# Patient Record
Sex: MALE | Race: BLACK OR AFRICAN AMERICAN | NOT HISPANIC OR LATINO | Employment: UNEMPLOYED | URBAN - METROPOLITAN AREA
[De-identification: names, ages, dates, MRNs, and addresses within clinical notes are randomized per-mention and may not be internally consistent; named-entity substitution may affect disease eponyms.]

---

## 2024-07-23 ENCOUNTER — HOSPITAL ENCOUNTER (EMERGENCY)
Facility: HOSPITAL | Age: 39
Discharge: PSYCHIATRIC OR SUBSTANCE USE FACILITY | End: 2024-07-24
Attending: EMERGENCY MEDICINE

## 2024-07-23 DIAGNOSIS — R45.851 SUICIDAL IDEATION: Primary | ICD-10-CM

## 2024-07-23 DIAGNOSIS — F10.10 ALCOHOL ABUSE: ICD-10-CM

## 2024-07-23 LAB
ALBUMIN SERPL BCP-MCNC: 3.6 G/DL (ref 3.5–5.2)
ALP SERPL-CCNC: 66 U/L (ref 55–135)
ALT SERPL W/O P-5'-P-CCNC: 28 U/L (ref 10–44)
AMPHET+METHAMPHET UR QL: ABNORMAL
ANION GAP SERPL CALC-SCNC: 9 MMOL/L (ref 8–16)
APAP SERPL-MCNC: <3 UG/ML (ref 10–20)
AST SERPL-CCNC: 42 U/L (ref 10–40)
BARBITURATES UR QL SCN>200 NG/ML: NEGATIVE
BASOPHILS # BLD AUTO: 0.05 K/UL (ref 0–0.2)
BASOPHILS NFR BLD: 0.7 % (ref 0–1.9)
BENZODIAZ UR QL SCN>200 NG/ML: NEGATIVE
BILIRUB SERPL-MCNC: 0.3 MG/DL (ref 0.1–1)
BILIRUB UR QL STRIP: NEGATIVE
BUN SERPL-MCNC: 7 MG/DL (ref 6–20)
BZE UR QL SCN: NEGATIVE
CALCIUM SERPL-MCNC: 9.7 MG/DL (ref 8.7–10.5)
CANNABINOIDS UR QL SCN: NEGATIVE
CHLORIDE SERPL-SCNC: 103 MMOL/L (ref 95–110)
CLARITY UR REFRACT.AUTO: CLEAR
CO2 SERPL-SCNC: 25 MMOL/L (ref 23–29)
COLOR UR AUTO: YELLOW
CREAT SERPL-MCNC: 1 MG/DL (ref 0.5–1.4)
CREAT UR-MCNC: 88 MG/DL (ref 23–375)
DIFFERENTIAL METHOD BLD: ABNORMAL
EOSINOPHIL # BLD AUTO: 0 K/UL (ref 0–0.5)
EOSINOPHIL NFR BLD: 0.4 % (ref 0–8)
ERYTHROCYTE [DISTWIDTH] IN BLOOD BY AUTOMATED COUNT: 13.4 % (ref 11.5–14.5)
EST. GFR  (NO RACE VARIABLE): >60 ML/MIN/1.73 M^2
ETHANOL SERPL-MCNC: 78 MG/DL
GLUCOSE SERPL-MCNC: 82 MG/DL (ref 70–110)
GLUCOSE UR QL STRIP: NEGATIVE
HCT VFR BLD AUTO: 40 % (ref 40–54)
HCV AB SERPL QL IA: NORMAL
HGB BLD-MCNC: 13.2 G/DL (ref 14–18)
HGB UR QL STRIP: NEGATIVE
HIV 1+2 AB+HIV1 P24 AG SERPL QL IA: NORMAL
IMM GRANULOCYTES # BLD AUTO: 0.01 K/UL (ref 0–0.04)
IMM GRANULOCYTES NFR BLD AUTO: 0.1 % (ref 0–0.5)
KETONES UR QL STRIP: NEGATIVE
LEUKOCYTE ESTERASE UR QL STRIP: NEGATIVE
LYMPHOCYTES # BLD AUTO: 2.9 K/UL (ref 1–4.8)
LYMPHOCYTES NFR BLD: 42.7 % (ref 18–48)
MCH RBC QN AUTO: 28.9 PG (ref 27–31)
MCHC RBC AUTO-ENTMCNC: 33 G/DL (ref 32–36)
MCV RBC AUTO: 88 FL (ref 82–98)
METHADONE UR QL SCN>300 NG/ML: NEGATIVE
MONOCYTES # BLD AUTO: 0.5 K/UL (ref 0.3–1)
MONOCYTES NFR BLD: 7.5 % (ref 4–15)
NEUTROPHILS # BLD AUTO: 3.3 K/UL (ref 1.8–7.7)
NEUTROPHILS NFR BLD: 48.6 % (ref 38–73)
NITRITE UR QL STRIP: NEGATIVE
NRBC BLD-RTO: 0 /100 WBC
OPIATES UR QL SCN: NEGATIVE
PCP UR QL SCN>25 NG/ML: NEGATIVE
PH UR STRIP: 8 [PH] (ref 5–8)
PLATELET # BLD AUTO: 250 K/UL (ref 150–450)
PMV BLD AUTO: 9.5 FL (ref 9.2–12.9)
POTASSIUM SERPL-SCNC: 3.7 MMOL/L (ref 3.5–5.1)
PROT SERPL-MCNC: 6.9 G/DL (ref 6–8.4)
PROT UR QL STRIP: NEGATIVE
RBC # BLD AUTO: 4.56 M/UL (ref 4.6–6.2)
SODIUM SERPL-SCNC: 137 MMOL/L (ref 136–145)
SP GR UR STRIP: 1.01 (ref 1–1.03)
TOXICOLOGY INFORMATION: ABNORMAL
TSH SERPL DL<=0.005 MIU/L-ACNC: 3.81 UIU/ML (ref 0.4–4)
URN SPEC COLLECT METH UR: NORMAL
WBC # BLD AUTO: 6.81 K/UL (ref 3.9–12.7)

## 2024-07-23 PROCEDURE — 81003 URINALYSIS AUTO W/O SCOPE: CPT | Mod: 59 | Performed by: PHYSICIAN ASSISTANT

## 2024-07-23 PROCEDURE — 84443 ASSAY THYROID STIM HORMONE: CPT | Performed by: PHYSICIAN ASSISTANT

## 2024-07-23 PROCEDURE — 86803 HEPATITIS C AB TEST: CPT | Performed by: PHYSICIAN ASSISTANT

## 2024-07-23 PROCEDURE — 80053 COMPREHEN METABOLIC PANEL: CPT | Performed by: PHYSICIAN ASSISTANT

## 2024-07-23 PROCEDURE — 25000003 PHARM REV CODE 250: Performed by: EMERGENCY MEDICINE

## 2024-07-23 PROCEDURE — 85025 COMPLETE CBC W/AUTO DIFF WBC: CPT | Performed by: PHYSICIAN ASSISTANT

## 2024-07-23 PROCEDURE — 87389 HIV-1 AG W/HIV-1&-2 AB AG IA: CPT | Performed by: PHYSICIAN ASSISTANT

## 2024-07-23 PROCEDURE — 82077 ASSAY SPEC XCP UR&BREATH IA: CPT | Performed by: PHYSICIAN ASSISTANT

## 2024-07-23 PROCEDURE — 80143 DRUG ASSAY ACETAMINOPHEN: CPT | Performed by: PHYSICIAN ASSISTANT

## 2024-07-23 PROCEDURE — 99285 EMERGENCY DEPT VISIT HI MDM: CPT

## 2024-07-23 PROCEDURE — 80307 DRUG TEST PRSMV CHEM ANLYZR: CPT | Performed by: PHYSICIAN ASSISTANT

## 2024-07-23 RX ORDER — AMLODIPINE BESYLATE 5 MG/1
5 TABLET ORAL
Status: COMPLETED | OUTPATIENT
Start: 2024-07-23 | End: 2024-07-23

## 2024-07-23 RX ADMIN — AMLODIPINE BESYLATE 5 MG: 5 TABLET ORAL at 08:07

## 2024-07-23 RX ADMIN — AMLODIPINE BESYLATE 5 MG: 5 TABLET ORAL at 11:07

## 2024-07-23 NOTE — ED PROVIDER NOTES
"Encounter Date: 7/23/2024       History     Chief Complaint   Patient presents with    Suicidal     38-year-old male with reported history of schizophrenia presents to the emergency department with suicidal ideation.  He states that he has been feeling suicidal for a while now.  He states that he has episodes in which he "plays chicken with cars. "He feels as though his suicidal thoughts are escalating and he feels as though this is the "calm before the storm. "He admits to attempts in the past.  He endorses alcohol and drug use.  States that he drinks about a 5th of liquor per day.  He drank about a pt before coming to the hospital today.  He also endorses drug use.  He states that he will take anything he can to make himself feel different.  He has been prescribed psychiatric medication in the past, but is noncompliant.  He can not recall the name of the medication.  He denies somatic complaints.  He denies other worsening or alleviating factors.      Review of patient's allergies indicates:   Allergen Reactions    Penicillins Anaphylaxis     No past medical history on file.  No past surgical history on file.  No family history on file.     Review of Systems   Psychiatric/Behavioral:  Positive for suicidal ideas.        Physical Exam     Initial Vitals [07/23/24 1517]   BP Pulse Resp Temp SpO2   131/85 100 20 98.4 °F (36.9 °C) 99 %      MAP       --         Physical Exam    Vitals reviewed.  Constitutional: He is not diaphoretic. No distress.   Disheveled    HENT:   Head: Normocephalic and atraumatic.   Mouth/Throat: Oropharynx is clear and moist.   Eyes: EOM are normal. Pupils are equal, round, and reactive to light.   Neck: Neck supple.   Normal range of motion.  Cardiovascular:  Normal rate.           Pulmonary/Chest: No respiratory distress.   Abdominal: He exhibits no distension.   Musculoskeletal:         General: Normal range of motion.      Cervical back: Normal range of motion and neck supple. "     Neurological: He is alert and oriented to person, place, and time. He has normal strength. GCS score is 15. GCS eye subscore is 4. GCS verbal subscore is 5. GCS motor subscore is 6.   Skin: Skin is warm and dry. Capillary refill takes less than 2 seconds.   Psychiatric: Judgment normal. His affect is blunt. His speech is delayed. He is withdrawn. He exhibits a depressed mood. He expresses suicidal ideation.         ED Course   Procedures  Labs Reviewed   CBC W/ AUTO DIFFERENTIAL - Abnormal       Result Value    WBC 6.81      RBC 4.56 (*)     Hemoglobin 13.2 (*)     Hematocrit 40.0      MCV 88      MCH 28.9      MCHC 33.0      RDW 13.4      Platelets 250      MPV 9.5      Immature Granulocytes 0.1      Gran # (ANC) 3.3      Immature Grans (Abs) 0.01      Lymph # 2.9      Mono # 0.5      Eos # 0.0      Baso # 0.05      nRBC 0      Gran % 48.6      Lymph % 42.7      Mono % 7.5      Eosinophil % 0.4      Basophil % 0.7      Differential Method Automated     COMPREHENSIVE METABOLIC PANEL - Abnormal    Sodium 137      Potassium 3.7      Chloride 103      CO2 25      Glucose 82      BUN 7      Creatinine 1.0      Calcium 9.7      Total Protein 6.9      Albumin 3.6      Total Bilirubin 0.3      Alkaline Phosphatase 66      AST 42 (*)     ALT 28      eGFR >60.0      Anion Gap 9     DRUG SCREEN PANEL, URINE EMERGENCY - Abnormal    Benzodiazepines Negative      Methadone metabolites Negative      Cocaine (Metab.) Negative      Opiate Scrn, Ur Negative      Barbiturate Screen, Ur Negative      Amphetamine Screen, Ur Presumptive Positive (*)     THC Negative      Phencyclidine Negative      Creatinine, Urine 88.0      Toxicology Information SEE COMMENT      Narrative:     Specimen Source->Urine   ALCOHOL,MEDICAL (ETHANOL) - Abnormal    Alcohol, Serum 78 (*)    ACETAMINOPHEN LEVEL - Abnormal    Acetaminophen (Tylenol), Serum <3.0 (*)    URINALYSIS, REFLEX TO URINE CULTURE    Specimen UA Urine, Clean Catch      Color, UA  Yellow      Appearance, UA Clear      pH, UA 8.0      Specific Gravity, UA 1.015      Protein, UA Negative      Glucose, UA Negative      Ketones, UA Negative      Bilirubin (UA) Negative      Occult Blood UA Negative      Nitrite, UA Negative      Leukocytes, UA Negative      Narrative:     Specimen Source->Urine   HIV 1 / 2 ANTIBODY   HEPATITIS C ANTIBODY   TSH          Imaging Results    None          Medications - No data to display  Medical Decision Making  Emergent evaluation of a 38 y.o. male presenting to the emergency department complaining of suicidal ideation. No active plan but is concerned for his safety and feels like he needs help. Patient is afebrile, hemodynamically stable, and non toxic appearing.     Will order psychiatric clearance labs and execute PEC.     Ddx includes but is not limited to suicidal ideation, acute psychosis, drug induced psychosis, depression, anxiety.     UA without infection. No severe metabolic or hematologic derangement. Lab work pending for medical clearance. Will sign out to supervising physician due to shift change. Anticipate transfer for psychiatric admission.   The patient's history, physical exam, and plan of care was discussed with and agreed upon with my supervising physician.       Amount and/or Complexity of Data Reviewed  Labs: ordered. Decision-making details documented in ED Course.               ED Course as of 07/23/24 1905 Tue Jul 23, 2024 1851 WBC: 6.81 [JM]   1851 Hemoglobin(!): 13.2 [JM]   1851 Hematocrit: 40.0 [JM]   1851 Platelet Count: 250 [JM]   1851 Blood, UA: Negative [JM]   1851 NITRITE UA: Negative [JM]   1851 Leukocyte Esterase, UA: Negative [JM]      ED Course User Index  [JM] Cristin Malone PA-C                           Clinical Impression:  Final diagnoses:  [R45.851] Suicidal ideation (Primary)  [F10.10] Alcohol abuse                 Cristin Malone PA-C  07/23/24 1905

## 2024-07-23 NOTE — FIRST PROVIDER EVALUATION
Emergency Department First Provider Evaluation    Willie Virgen   38 y.o. male   [id number]      7/23/2024        Medical screening examination initiated prior to patient room assignment.        History of present illness:  Complains of suicidal thoughts.    There were no vitals filed for this visit.    Pertinent physical examination findings:  Calm. Unkempt.    Brief workup plan:  Direct to bed for psychiatric workup.          This brief and focused assessment was performed to initiate workup and treatment. Follow-up of these results will be performed by the assigned treatment team. Additional data collection, labs, imaging studies, and treatments may be needed for completion of this patient encounter.

## 2024-07-23 NOTE — ED NOTES
Pt changed into paper scrubs and belongings placed in belongings back in safe closet includinx shirt     1x vest     1x shorts    1x socks     1x shoes     3x neck less     1x book

## 2024-07-23 NOTE — ED NOTES
Pt resting comfortably in stretcher. No medical equipment or personal belongings in room. Environment safe. Gorge Jauregui in direct view of patient documenting Q15 per flowsheets. Bed in lowest position. Respirations even and unlabored. Pt remains in paper scrubs. Awaiting medical clearance for psych pt. Water given to pt.

## 2024-07-23 NOTE — ED NOTES
Pt valuables placed in valuables bag No. [id number] and given to supervisor to place in safe includinx phone    1x      $1 w/ misc coins     1x nose ring     1x ring     1x keys    1x wallet

## 2024-07-24 VITALS
OXYGEN SATURATION: 100 % | DIASTOLIC BLOOD PRESSURE: 110 MMHG | TEMPERATURE: 99 F | HEART RATE: 73 BPM | WEIGHT: 173 LBS | BODY MASS INDEX: 20.02 KG/M2 | HEIGHT: 78 IN | SYSTOLIC BLOOD PRESSURE: 166 MMHG | RESPIRATION RATE: 18 BRPM

## 2024-07-24 NOTE — ED NOTES
Escorted to & from the restroom where patient voided. He returned to his room. He declines snacks or to watch the TV. DVC

## 2024-07-24 NOTE — ED NOTES
Escorted to & from the restroom where patient voided. Gait remains steady. Patient is currently in bed w/ TV on  & is watching from time to time. DVC maintained for safety.

## 2024-07-24 NOTE — ED NOTES
Assumed care of pt.     Pt remains in paper scrubs, resting in stretcher comfortably - with side rails up, locked, and in lowest position. Chest rise and fall noted; breathing equal, even, and unlabored. Sitter remains at bedside in direct visual contact, charting per protocol every 15 minutes. No equipment or belongings are in the patients room to prevent self harm or injury. Pt aware of plan of care. No acute distress noted and no needs expressed at this time. Will continue to assess periodically.

## 2024-07-24 NOTE — ED NOTES
Resuming a resting position in bed, lights dimmed, both side rails in the upright position for safety.

## 2024-07-24 NOTE — ED TRIAGE NOTES
"38-year-old male with reported history of schizophrenia presents to the emergency department with suicidal ideation. He states that he has been feeling suicidal for a while now. He states that he has episodes in which he "plays chicken with cars. "He feels as though his suicidal thoughts are escalating and he feels as though this is the "calm before the storm. "He admits to attempts in the past. He endorses alcohol and drug use.   "

## 2024-07-24 NOTE — ED NOTES
Awaiting placement. Explained POC to patient, acknowledge understanding. NAD, offering no complaints. DVC maintained for safety.

## 2024-07-24 NOTE — ED NOTES
"Patient asked, " so I'm a have to take that pressure medicine everyday?" Explained patient that yes he will have to take B/P medicine daily to keep his pressure @ an acceptable level & to avoid a possible stroke. Patient acknowledges understanding stating, "ok." DVC maintained for safety.  "

## 2024-07-24 NOTE — ED NOTES
Escorted to & from the restroom where patient voided. He is currently awake in bed. He accepted a pitcher of ice & water. DVC maintained for safety.

## 2024-07-24 NOTE — ED NOTES
The patient is escorted to the [facility] vehicle via w/c per sitter/nurse & hospital security. All belongings to include a valuable envelope is given to [facility] . He did not state anyone to be notified of his transfer.

## 2024-07-25 PROBLEM — F19.94 SUBSTANCE INDUCED MOOD DISORDER: Status: RESOLVED | Noted: 2024-07-25 | Resolved: 2024-07-25

## 2024-07-25 PROBLEM — R45.851 SUICIDAL IDEATION: Status: RESOLVED | Noted: 2024-07-25 | Resolved: 2024-07-25

## 2024-07-25 PROBLEM — F41.1 GENERALIZED ANXIETY DISORDER: Chronic | Status: ACTIVE | Noted: 2024-07-25

## 2024-07-25 PROBLEM — F10.90 ALCOHOL USE DISORDER: Chronic | Status: ACTIVE | Noted: 2024-07-25

## 2024-07-25 PROBLEM — R45.851 SUICIDAL IDEATION: Status: ACTIVE | Noted: 2024-07-25

## 2024-07-25 PROBLEM — F43.10 PTSD (POST-TRAUMATIC STRESS DISORDER): Chronic | Status: ACTIVE | Noted: 2024-07-25

## 2024-07-25 PROBLEM — F19.20 POLYSUBSTANCE (EXCLUDING OPIOIDS) DEPENDENCE: Chronic | Status: ACTIVE | Noted: 2024-07-25

## 2024-07-25 PROBLEM — F19.94 SUBSTANCE INDUCED MOOD DISORDER: Status: ACTIVE | Noted: 2024-07-25
